# Patient Record
Sex: MALE | Race: OTHER | Employment: UNEMPLOYED | URBAN - METROPOLITAN AREA
[De-identification: names, ages, dates, MRNs, and addresses within clinical notes are randomized per-mention and may not be internally consistent; named-entity substitution may affect disease eponyms.]

---

## 2020-07-02 ENCOUNTER — HOSPITAL ENCOUNTER (EMERGENCY)
Facility: HOSPITAL | Age: 55
Discharge: HOME | End: 2020-07-02
Payer: MEDICAID

## 2020-07-02 ENCOUNTER — APPOINTMENT (OUTPATIENT)
Dept: GENERAL RADIOLOGY | Facility: HOSPITAL | Age: 55
End: 2020-07-02
Attending: NURSE PRACTITIONER
Payer: MEDICAID

## 2020-07-02 VITALS
DIASTOLIC BLOOD PRESSURE: 101 MMHG | OXYGEN SATURATION: 97 % | RESPIRATION RATE: 18 BRPM | HEART RATE: 79 BPM | TEMPERATURE: 98 F | BODY MASS INDEX: 28.13 KG/M2 | WEIGHT: 175 LBS | HEIGHT: 66 IN | SYSTOLIC BLOOD PRESSURE: 138 MMHG

## 2020-07-02 DIAGNOSIS — S62.639B OPEN FRACTURE OF TUFT OF DISTAL PHALANX OF FINGER: Primary | ICD-10-CM

## 2020-07-02 PROCEDURE — 73130 X-RAY EXAM OF HAND: CPT | Performed by: NURSE PRACTITIONER

## 2020-07-02 PROCEDURE — 99284 EMERGENCY DEPT VISIT MOD MDM: CPT

## 2020-07-02 PROCEDURE — 99283 EMERGENCY DEPT VISIT LOW MDM: CPT

## 2020-07-02 RX ORDER — CEPHALEXIN 500 MG/1
500 CAPSULE ORAL 4 TIMES DAILY
Qty: 40 CAPSULE | Refills: 0 | Status: SHIPPED | OUTPATIENT
Start: 2020-07-02 | End: 2020-07-12

## 2020-07-02 RX ORDER — IBUPROFEN 600 MG/1
600 TABLET ORAL EVERY 6 HOURS PRN
Qty: 20 TABLET | Refills: 0 | Status: SHIPPED | OUTPATIENT
Start: 2020-07-02 | End: 2020-07-09

## 2020-07-02 RX ORDER — IBUPROFEN 600 MG/1
600 TABLET ORAL ONCE
Status: COMPLETED | OUTPATIENT
Start: 2020-07-02 | End: 2020-07-02

## 2020-07-02 NOTE — ED NOTES
Spoke with Skyler Moran [phone] ( for [employer]) who's requesting breathalyzer and 10 panel drug screen. [employer] called who stated they only come to ER after hours.  Advised patient to go to Occupational heal

## 2020-07-02 NOTE — ED NOTES
While at work today, pt sustained a crushing injury from a \"paper presser\" to the L 3rd,4th, and 5th digit. CMS intact. Pt states he is UTD with Tetanus.

## 2020-07-02 NOTE — ED PROVIDER NOTES
Patient Seen in: [facility] AND [facility] Emergency Department    History   CC: finger pain  HPI: Sherryle Curb 54year old male  who presents c/o left finger pain to digits 3 through 5 status post work-related injury today in which patient states his fingers got deformity/swelling cranium, scalp, or facial bones  Skin - left hand digits 3 through 5 diffusely edematous. There are multiple skin tears are small lacerations less than 0.25 cm in diameter.   Skin is otherwise pink warm and dry throughout, mmm, no other motion due to swelling. Advises Keflex and Motrin as well as nonpharmacologic modalities such as elevation and ice.   I reviewed this extensively with patient as well as need to follow-up with both hand specialty and occupational health as this is a [employer]

## 2021-09-18 ENCOUNTER — LAB SERVICES (OUTPATIENT)
Dept: OTHER | Age: 56
End: 2021-09-18

## 2021-09-18 DIAGNOSIS — Z52.001 STEM CELL DONOR: Primary | ICD-10-CM

## 2021-09-18 PROCEDURE — 81372 HLA I TYPING COMPLETE LR: CPT | Performed by: CLINICAL MEDICAL LABORATORY

## 2021-09-29 LAB
REF LAB TEST NAME: NORMAL
SERVICE CMNT-IMP: NORMAL